# Patient Record
Sex: MALE | Race: BLACK OR AFRICAN AMERICAN | NOT HISPANIC OR LATINO | URBAN - METROPOLITAN AREA
[De-identification: names, ages, dates, MRNs, and addresses within clinical notes are randomized per-mention and may not be internally consistent; named-entity substitution may affect disease eponyms.]

---

## 2024-08-26 ENCOUNTER — APPOINTMENT (OUTPATIENT)
Dept: URBAN - METROPOLITAN AREA CLINIC 235 | Age: 29
Setting detail: DERMATOLOGY
End: 2024-08-28

## 2024-08-26 DIAGNOSIS — L21.8 OTHER SEBORRHEIC DERMATITIS: ICD-10-CM

## 2024-08-26 PROCEDURE — OTHER COUNSELING: OTHER

## 2024-08-26 PROCEDURE — OTHER TREATMENT REGIMEN: OTHER

## 2024-08-26 PROCEDURE — 99204 OFFICE O/P NEW MOD 45 MIN: CPT

## 2024-08-26 PROCEDURE — OTHER PRESCRIPTION: OTHER

## 2024-08-26 RX ORDER — HYDROCORTISONE 25 MG/G
CREAM TOPICAL
Qty: 30 | Refills: 1 | Status: ERX | COMMUNITY
Start: 2024-08-26

## 2024-08-26 RX ORDER — KETOCONAZOLE 20 MG/G
CREAM TOPICAL
Qty: 30 | Refills: 0 | Status: ERX | COMMUNITY
Start: 2024-08-26

## 2024-08-26 RX ORDER — CLOBETASOL PROPIONATE 0.5 MG/ML
SOLUTION TOPICAL
Qty: 50 | Refills: 2 | Status: ERX | COMMUNITY
Start: 2024-08-26

## 2024-08-26 RX ORDER — CLINDAMYCIN PHOSPHATE 10 MG/ML
LOTION TOPICAL
Qty: 60 | Refills: 3 | Status: ERX | COMMUNITY
Start: 2024-08-26

## 2024-08-26 ASSESSMENT — LOCATION ZONE DERM
LOCATION ZONE: NOSE
LOCATION ZONE: SCALP
LOCATION ZONE: FACE

## 2024-08-26 ASSESSMENT — LOCATION SIMPLE DESCRIPTION DERM
LOCATION SIMPLE: LEFT NOSE
LOCATION SIMPLE: SUPERIOR FOREHEAD
LOCATION SIMPLE: SCALP

## 2024-08-26 ASSESSMENT — LOCATION DETAILED DESCRIPTION DERM
LOCATION DETAILED: LEFT NASAL ALAR GROOVE
LOCATION DETAILED: RIGHT CENTRAL POSTAURICULAR SKIN
LOCATION DETAILED: SUPERIOR MID FOREHEAD
LOCATION DETAILED: LEFT SUPERIOR PARIETAL SCALP
LOCATION DETAILED: LEFT CENTRAL POSTAURICULAR SKIN

## 2024-08-26 NOTE — HPI: ECZEMA (PATIENT REPORTED)
Where Is Your Eczema Located?: Ears , nose , and hair line
List Prescription Topical Steroids You Tried (Separate Each Name With A Comma):: Triamcinolone
List Prescription Topical Steroids That Worked Best (Separate Each Name With A Comma):: Triamcinolone for body

## 2024-08-26 NOTE — HPI: ACNE (PATIENT REPORTED)
Where Is Your Acne Located?: Face
List Over The Counter Products You Tried (Separate Each Name With A Comma):: Cerave foaming cleanser , cerave moisturizer
List Over The Counter Products You Are Currently On (Separate Each Name With A Comma):: Cerave moisturizer
Please List The Treatments That Have Worked Best For Your Acne: (Separate Each Entry With A Comma): Doxycycline

## 2024-08-26 NOTE — PROCEDURE: TREATMENT REGIMEN
Continue Regimen: Ketoconozole from previous provider
Detail Level: Zone
Initiate Treatment: clobetasol 0.05 % scalp solution \\nSig: Apply to affected area on scalp twice daily as needed for itching.\\n\\nketoconazole 2 % cream \\nSig: Apply to the affected areas on the ears and nose twice daily for two to four weeks\\n\\nhydrocortisone 2.5 % topical cream \\nSig: apply a thin layer to the affected areas on face on the days that hair removal is used then at night same day.
Plan: For the first two weeks wash every 2-3 days. Then alternate ketoconozole shampoo with [facility] T-Sal shampoo\\nKeep hair as long as possible\\n\\nFace regimen \\nCleanser \\nClindamycin \\nHydrocortisone on hair removal days \\nmoisturizer
Otc Regimen: [facility] T Sal shampoo when not using ketoconozole shampoo\\nSalicylic acid cleanser for face\\nCLN face wash

## 2024-11-07 RX ORDER — CLINDAMYCIN PHOSPHATE 10 MG/ML
LOTION TOPICAL
Qty: 60 | Refills: 0 | Status: ERX

## 2024-12-02 ENCOUNTER — APPOINTMENT (OUTPATIENT)
Dept: URBAN - METROPOLITAN AREA CLINIC 235 | Age: 29
Setting detail: DERMATOLOGY
End: 2024-12-03

## 2024-12-02 DIAGNOSIS — L72.8 OTHER FOLLICULAR CYSTS OF THE SKIN AND SUBCUTANEOUS TISSUE: ICD-10-CM

## 2024-12-02 DIAGNOSIS — L21.8 OTHER SEBORRHEIC DERMATITIS: ICD-10-CM

## 2024-12-02 PROCEDURE — 99214 OFFICE O/P EST MOD 30 MIN: CPT

## 2024-12-02 PROCEDURE — OTHER REASSURANCE: OTHER

## 2024-12-02 PROCEDURE — OTHER PRESCRIPTION: OTHER

## 2024-12-02 PROCEDURE — OTHER TREATMENT REGIMEN: OTHER

## 2024-12-02 PROCEDURE — OTHER COUNSELING: OTHER

## 2024-12-02 RX ORDER — CLOBETASOL PROPIONATE 0.5 MG/ML
SOLUTION TOPICAL
Qty: 50 | Refills: 2 | Status: ERX

## 2024-12-02 RX ORDER — HYDROCORTISONE 25 MG/G
CREAM TOPICAL
Qty: 30 | Refills: 1 | Status: ERX

## 2024-12-02 RX ORDER — CLINDAMYCIN PHOSPHATE 10 MG/ML
LOTION TOPICAL
Qty: 60 | Refills: 3 | Status: ERX

## 2024-12-02 RX ORDER — KETOCONAZOLE 20 MG/G
CREAM TOPICAL
Qty: 30 | Refills: 3 | Status: ERX

## 2024-12-02 ASSESSMENT — LOCATION DETAILED DESCRIPTION DERM
LOCATION DETAILED: SUPERIOR MID FOREHEAD
LOCATION DETAILED: LEFT SUPERIOR PARIETAL SCALP
LOCATION DETAILED: RIGHT CENTRAL POSTAURICULAR SKIN
LOCATION DETAILED: PHILTRUM
LOCATION DETAILED: LEFT NASAL ALAR GROOVE
LOCATION DETAILED: LEFT CENTRAL POSTAURICULAR SKIN

## 2024-12-02 ASSESSMENT — LOCATION SIMPLE DESCRIPTION DERM
LOCATION SIMPLE: SCALP
LOCATION SIMPLE: SUPERIOR FOREHEAD
LOCATION SIMPLE: UPPER LIP
LOCATION SIMPLE: LEFT NOSE

## 2024-12-02 ASSESSMENT — LOCATION ZONE DERM
LOCATION ZONE: SCALP
LOCATION ZONE: FACE
LOCATION ZONE: LIP
LOCATION ZONE: NOSE

## 2024-12-02 NOTE — PROCEDURE: TREATMENT REGIMEN
Continue Regimen: Ketoconozole from previous provider\\n\\nclobetasol 0.05 % scalp solution \\nSig: Apply to affected area on scalp twice daily as needed for itching.\\n\\nketoconazole 2 % cream \\nSig: Apply to the affected areas on the ears and nose twice daily for two to four weeks\\n\\nhydrocortisone 2.5 % topical cream \\nSig: apply a thin layer to the affected areas on face on the days that hair removal is used then at night same day.
Detail Level: Zone
Plan: For the first two weeks wash every 2-3 days. Then alternate ketoconozole shampoo with [facility] T-Sal shampoo\\nKeep hair as long as possible\\n\\nFace regimen \\nCleanser \\nClindamycin \\nHydrocortisone on hair removal days \\nmoisturizer
Otc Regimen: [facility] T Sal shampoo when not using ketoconozole shampoo\\nSalicylic acid cleanser for face\\nCLN face wash
Otc Regimen: Adapalene 0.1% gel

## 2025-06-02 ENCOUNTER — APPOINTMENT (OUTPATIENT)
Dept: URBAN - METROPOLITAN AREA CLINIC 235 | Age: 30
Setting detail: DERMATOLOGY
End: 2025-06-02

## 2025-06-02 DIAGNOSIS — L70.0 ACNE VULGARIS: ICD-10-CM

## 2025-06-02 DIAGNOSIS — L20.89 OTHER ATOPIC DERMATITIS: ICD-10-CM

## 2025-06-02 DIAGNOSIS — L21.8 OTHER SEBORRHEIC DERMATITIS: ICD-10-CM

## 2025-06-02 PROCEDURE — OTHER PRESCRIPTION MEDICATION MANAGEMENT: OTHER

## 2025-06-02 PROCEDURE — OTHER PRESCRIPTION: OTHER

## 2025-06-02 PROCEDURE — OTHER TREATMENT REGIMEN: OTHER

## 2025-06-02 PROCEDURE — 99214 OFFICE O/P EST MOD 30 MIN: CPT

## 2025-06-02 PROCEDURE — OTHER COUNSELING: OTHER

## 2025-06-02 RX ORDER — TRIAMCINOLONE ACETONIDE 1 MG/ML
LOTION TOPICAL
Qty: 60 | Refills: 1 | Status: ERX | COMMUNITY
Start: 2025-06-02

## 2025-06-02 RX ORDER — CLOBETASOL PROPIONATE 0.5 MG/ML
SOLUTION TOPICAL
Qty: 50 | Refills: 2 | Status: ERX

## 2025-06-02 RX ORDER — KETOCONAZOLE 20 MG/G
CREAM TOPICAL
Qty: 30 | Refills: 3 | Status: ERX

## 2025-06-02 RX ORDER — HYDROCORTISONE 25 MG/G
CREAM TOPICAL
Qty: 30 | Refills: 1 | Status: ERX

## 2025-06-02 RX ORDER — CLINDAMYCIN PHOSPHATE 10 MG/ML
LOTION TOPICAL
Qty: 60 | Refills: 3 | Status: ERX

## 2025-06-02 RX ORDER — CLASCOTERONE 1 G/100G
CREAM TOPICAL
Qty: 60 | Refills: 2 | Status: CANCELLED
Stop reason: ENTERED-IN-ERROR

## 2025-06-02 ASSESSMENT — LOCATION DETAILED DESCRIPTION DERM
LOCATION DETAILED: LEFT CENTRAL MALAR CHEEK
LOCATION DETAILED: LEFT SUPERIOR PARIETAL SCALP
LOCATION DETAILED: RIGHT CENTRAL POSTAURICULAR SKIN
LOCATION DETAILED: SUPERIOR MID FOREHEAD
LOCATION DETAILED: LEFT MEDIAL FOREHEAD
LOCATION DETAILED: RIGHT MEDIAL MALAR CHEEK
LOCATION DETAILED: LEFT CENTRAL POSTAURICULAR SKIN
LOCATION DETAILED: LEFT NASAL ALAR GROOVE

## 2025-06-02 ASSESSMENT — LOCATION ZONE DERM
LOCATION ZONE: FACE
LOCATION ZONE: NOSE
LOCATION ZONE: SCALP

## 2025-06-02 ASSESSMENT — LOCATION SIMPLE DESCRIPTION DERM
LOCATION SIMPLE: SUPERIOR FOREHEAD
LOCATION SIMPLE: LEFT FOREHEAD
LOCATION SIMPLE: SCALP
LOCATION SIMPLE: LEFT NOSE
LOCATION SIMPLE: LEFT CHEEK
LOCATION SIMPLE: RIGHT CHEEK

## 2025-08-07 ENCOUNTER — APPOINTMENT (OUTPATIENT)
Dept: URBAN - METROPOLITAN AREA CLINIC 235 | Age: 30
Setting detail: DERMATOLOGY
End: 2025-08-07

## 2025-08-07 DIAGNOSIS — L21.8 OTHER SEBORRHEIC DERMATITIS: ICD-10-CM

## 2025-08-07 DIAGNOSIS — L20.89 OTHER ATOPIC DERMATITIS: ICD-10-CM

## 2025-08-07 DIAGNOSIS — L70.0 ACNE VULGARIS: ICD-10-CM

## 2025-08-07 DIAGNOSIS — L65.9 NONSCARRING HAIR LOSS, UNSPECIFIED: ICD-10-CM

## 2025-08-07 PROCEDURE — OTHER COUNSELING: OTHER

## 2025-08-07 PROCEDURE — OTHER PRESCRIPTION: OTHER

## 2025-08-07 PROCEDURE — 99214 OFFICE O/P EST MOD 30 MIN: CPT

## 2025-08-07 PROCEDURE — OTHER PRESCRIPTION MEDICATION MANAGEMENT: OTHER

## 2025-08-07 RX ORDER — TRIAMCINOLONE ACETONIDE 0.25 MG/G
CREAM TOPICAL
Qty: 454 | Refills: 3 | Status: ERX | COMMUNITY
Start: 2025-08-07

## 2025-08-07 RX ORDER — CLINDAMYCIN PHOSPHATE 10 MG/ML
LOTION TOPICAL
Qty: 60 | Refills: 3 | Status: ERX

## 2025-08-07 RX ORDER — PHARMACY COMPOUNDING ACCESSORY
EACH MISCELLANEOUS
Qty: 30 | Refills: 4 | Status: ERX | COMMUNITY
Start: 2025-08-07

## 2025-08-07 RX ORDER — CLASCOTERONE 1 G/100G
CREAM TOPICAL
Qty: 60 | Refills: 2 | Status: ERX | COMMUNITY
Start: 2025-08-07

## 2025-08-07 ASSESSMENT — LOCATION SIMPLE DESCRIPTION DERM
LOCATION SIMPLE: LEFT SCALP
LOCATION SIMPLE: LEFT CHEEK
LOCATION SIMPLE: LEFT FOREHEAD
LOCATION SIMPLE: LEFT NOSE
LOCATION SIMPLE: SCALP
LOCATION SIMPLE: RIGHT CHEEK
LOCATION SIMPLE: LEFT POSTAURICULAR SKIN
LOCATION SIMPLE: SUPERIOR FOREHEAD

## 2025-08-07 ASSESSMENT — LOCATION DETAILED DESCRIPTION DERM
LOCATION DETAILED: RIGHT CENTRAL MALAR CHEEK
LOCATION DETAILED: LEFT MEDIAL FRONTAL SCALP
LOCATION DETAILED: LEFT SUPERIOR PARIETAL SCALP
LOCATION DETAILED: LEFT MEDIAL FOREHEAD
LOCATION DETAILED: LEFT NASAL ALAR GROOVE
LOCATION DETAILED: RIGHT MEDIAL MALAR CHEEK
LOCATION DETAILED: RIGHT CENTRAL POSTAURICULAR SKIN
LOCATION DETAILED: LEFT CENTRAL MALAR CHEEK
LOCATION DETAILED: LEFT CENTRAL POSTAURICULAR SKIN
LOCATION DETAILED: SUPERIOR MID FOREHEAD

## 2025-08-07 ASSESSMENT — LOCATION ZONE DERM
LOCATION ZONE: NOSE
LOCATION ZONE: FACE
LOCATION ZONE: SCALP